# Patient Record
Sex: MALE | Race: WHITE | Employment: OTHER | URBAN - METROPOLITAN AREA
[De-identification: names, ages, dates, MRNs, and addresses within clinical notes are randomized per-mention and may not be internally consistent; named-entity substitution may affect disease eponyms.]

---

## 2017-01-27 ENCOUNTER — OFFICE VISIT (OUTPATIENT)
Dept: ORTHOPEDIC SURGERY | Age: 72
End: 2017-01-27

## 2017-01-27 VITALS
WEIGHT: 230 LBS | HEIGHT: 72 IN | SYSTOLIC BLOOD PRESSURE: 153 MMHG | HEART RATE: 64 BPM | BODY MASS INDEX: 31.15 KG/M2 | DIASTOLIC BLOOD PRESSURE: 69 MMHG

## 2017-01-27 DIAGNOSIS — M54.5 LOW BACK PAIN, UNSPECIFIED BACK PAIN LATERALITY, UNSPECIFIED CHRONICITY, WITH SCIATICA PRESENCE UNSPECIFIED: Primary | ICD-10-CM

## 2017-01-27 DIAGNOSIS — M51.36 DDD (DEGENERATIVE DISC DISEASE), LUMBAR: ICD-10-CM

## 2017-01-27 DIAGNOSIS — M47.817 LUMBOSACRAL SPONDYLOSIS WITHOUT MYELOPATHY: ICD-10-CM

## 2017-01-27 RX ORDER — ASPIRIN 81 MG/1
TABLET ORAL DAILY
COMMUNITY
End: 2019-04-29

## 2017-01-27 NOTE — MR AVS SNAPSHOT
Visit Information Date & Time Provider Department Dept. Phone Encounter #  
 1/27/2017  9:25 AM Vitaliy Mack MD [facility] [place] [phone] [id number] Follow-up Instructions Return in about 4 weeks (around 2/24/2017). Upcoming Health Maintenance Date Due Hepatitis C Screening 1945 DTaP/Tdap/Td series (1 - Tdap) 8/27/1967 FOBT Q 1 YEAR AGE 50-75 8/27/1996 ZOSTER VACCINE AGE 60> 8/27/2006 GLAUCOMA SCREENING Q2Y 8/27/2011 Pneumococcal 65+ Low/Medium Risk (1 of 2 - PCV13) 8/27/2011 MEDICARE YEARLY EXAM 8/27/2011 INFLUENZA AGE 9 TO ADULT 8/1/2016 Allergies as of 1/27/2017  Review Complete On: 1/27/2017 By: Vitaliy Mack MD  
  
 Severity Noted Reaction Type Reactions Niaspan [Niacin]  02/12/2014    Other (comments) Intolerance Current Immunizations  Never Reviewed No immunizations on file. Not reviewed this visit You Were Diagnosed With   
  
 Codes Comments Low back pain, unspecified back pain laterality, unspecified chronicity, with sciatica presence unspecified    -  Primary ICD-10-CM: M54.5 ICD-9-CM: 724.2 Lumbosacral spondylosis without myelopathy     ICD-10-CM: C93.934 ICD-9-CM: 721.3 DDD (degenerative disc disease), lumbar     ICD-10-CM: M51.36 
ICD-9-CM: 722.52 Vitals BP Pulse Height(growth percentile) Weight(growth percentile) BMI Smoking Status 153/69 64 6' (1.829 m) 230 lb (104.3 kg) 31.19 kg/m2 Never Smoker Vitals History BMI and BSA Data Body Mass Index Body Surface Area  
 31.19 kg/m 2 2.3 m 2 Preferred Pharmacy Pharmacy Name Phone Matt Snow, [place] [facility] [phone] Your Updated Medication List  
  
   
This list is accurate as of: 1/27/17 10:32 AM.  Always use your most recent med list.  
  
  
  
  
 aspirin delayed-release 81 mg tablet Take  by mouth daily. FISH OIL 1,000 mg Cap Generic drug:  omega-3 fatty acids-vitamin e Take 1 Cap by mouth. FLOMAX 0.4 mg capsule Generic drug:  tamsulosin Take 0.4 mg by mouth daily. We Performed the Following AMB POC XRAY, SPINE, LUMBOSACRAL; 2 O [54211 CPT(R)] REFERRAL TO PHYSICAL THERAPY [CKD85 Custom] Comments:  
 DX:LBP to LLE 
HEP 
LOCATION:In [facility] [address] 
2-3 visits/ 2-3 weeks Follow-up Instructions Return in about 4 weeks (around 2/24/2017). Referral Information Referral ID Referred By Referred To  
  
 [id number] JOSE ANDRE III Not Available Visits Status Start Date End Date 9 New Request 1/27/17 1/27/18 If your referral has a status of pending review or denied, additional information will be sent to support the outcome of this decision. Introducing [facility]! Balaji Huitron introduces [employer] patient portal. Now you can access parts of your medical record, email your doctor's office, and request medication refills online. 1. In your internet browser, go to [email or web address] 2. Click on the First Time User? Click Here link in the Sign In box. You will see the New Member Sign Up page. 3. Enter your [employer] Access Code exactly as it appears below. You will not need to use this code after youve completed the sign-up process. If you do not sign up before the expiration date, you must request a new code. · [employer] Access Code: [email or web address] Expires: 4/27/2017 10:31 AM 
 
4. Enter the last four digits of your Social Security Number (xxxx) and Date of Birth (mm/dd/yyyy) as indicated and click Submit. You will be taken to the next sign-up page. 5. Create a [employer] ID. This will be your [employer] login ID and cannot be changed, so think of one that is secure and easy to remember. 6. Create a [employer] password. You can change your password at any time. 7. Enter your Password Reset Question and Answer. This can be used at a later time if you forget your password. 8. Enter your e-mail address. You will receive e-mail notification when new information is available in [address] 9. Click Sign Up. You can now view and download portions of your medical record. 10. Click the Download Summary menu link to download a portable copy of your medical information. If you have questions, please visit the Frequently Asked Questions section of the [employer] website. Remember, [employer] is NOT to be used for urgent needs. For medical emergencies, dial 911. Now available from your iPhone and Android! Please provide this summary of care documentation to your next provider. Your primary care clinician is listed as Munira Sullivan. If you have any questions after today's visit, please call [phone].

## 2017-01-27 NOTE — PROGRESS NOTES
[facility]  [address], [place] [address]  Phone: [phone]  Fax: [phone]        INITIAL CONSULTATION      HISTORY OF PRESENT ILLNESS:  Catrachita Taylor. is a RH dominant 80 y.o. male whom is self referred secondary to left lower back pain. He reports pain in the lower back, radiating down the left leg to the ankle x 2 weeks. The distrubution of his pain is unclear at this time. Patient denies fever, weight loss, change in bowel habits or skin changes. Pt denies any inciting injuries. He reports that he experiences increase pain with standing and walking. He denies any prior surgery, blocks, or recent PT. He last tried PT in 1998. Pt repots intermittent lower back pain over the years but states that his left leg pain has never been this severe. He states that his leg pain has improved within the last week but he continues to experience left lower back pain at this time. Today, Pt rates his pain 2-4/10. ER Note from [facility] dated 11/08/2016 indicated the patient was seen c/o acute right lower back pain after lifting a heavy bag of soil. Pt's pain was easily reproducible with palpation and certain movements, suspected musculoskeletal related. Pt reports that during his ER visit he experienced right lowe back pain but at this time he reports left lower back pain. While at the ER, Pt was prescribed Flexeril 10 mg and Norco 5-325 mg but reports minimal benefit when taking the medication. He has been taking ibuprofen as needed. Of note, Pt is currently taking aspirin. Pt has hx of 4 renal stones and is a non-smoker.  reviewed. Past Medical History   Diagnosis Date    Abnormal PSA      5.6 =12/2013    BPH (benign prostatic hyperplasia)      flomax per PCP started 2011    Hypertension     Kidney stone      litho need 2012   : Th  Past Surgical History   Procedure Laterality Date    Hx colonoscopy  2007   e patient is no employed. Marital status:  The patient is . Current Outpatient Prescriptions   Medication Sig Dispense Refill    aspirin delayed-release 81 mg tablet Take  by mouth daily.  tamsulosin (FLOMAX) 0.4 mg capsule Take 0.4 mg by mouth daily.  omega-3 fatty acids-vitamin e (FISH OIL) 1,000 mg cap Take 1 Cap by mouth. Allergies   Allergen Reactions    Niaspan [Niacin] Other (comments)     Intolerance            Family History   Problem Relation Age of Onset    Hypertension Other        REVIEW OF SYSTEMS  Constitutional symptoms: Negative  Eyes: Negative  Ears, Nose, Throat, and Mouth: Negative  Cardiovascular: Negative  Respiratory: Negative  Genitourinary: Negative  Integumentary (Skin and/or breast): Negative  Musculoskeletal: Positive for lumbar pain  Extremities: Negative for edema. Endocrine/Rheumatologic: Negative  Hematologic/Lymphatic: Negative  Allergic/Immunologic: Negative  Psychiatric: Negative     PHYSICAL EXAMINATION    Visit Vitals    /69    Pulse 64    Ht 6' (1.829 m)    Wt 230 lb (104.3 kg)    BMI 31.19 kg/m2       CONSTITUTIONAL: NAD, A&O x 3  HEART: Regular rate and rhythm  ABDOMEN: Positive bowel sounds, soft, nontender, and nondistended  LUNGS: Clear to auscultation bilaterally. RANGE OF MOTION: The patient has full passive range of motion in all four extremities. SENSATION: Sensation is intact to light touch throughout. MOTOR:   Straight Leg Raise: Negative, bilateral  Steele: Negative, bilateral  Tandem Gait: Neg. Deep tendon reflexes are 1+ at the brachioradialis, biceps, and triceps. Deep tendon reflexes are 2+ at the knees and 1+ ankles bilaterally. Nontender to palpation in the lumbar region.       Shoulder AB/Flex Elbow Flex Wrist Ext Elbow Ext Wrist Flex Hand Intrin Tone   Right +4/5 +4/5 +4/5 +4/5 +4/5 +4/5 +4/5   Left +4/5 +4/5 +4/5 +4/5 +4/5 +4/5 +4/5              Hip Flex Knee Ext Knee Flex Ankle DF GTE Ankle PF Tone   Right +4/5 +4/5 +4/5 +4/5 +4/5 +4/5 +4/5   Left +4/5 +4/5 +4/5 +4/5 +4/5 +4/5 +4/5     RADIOGRAPHS  Preliminary reading of lumbar plain films:  Mild dextrorotary scoliosis if the lumbar spine. Superior endplate compress deformity at T12, age indeterminate. Small anterior osteophytes at all levels of the lumbar spine. These are being sent out for official reading by Dr. Hailey Holland. Ashley Dailey was seen today for back pain. Diagnoses and all orders for this visit:    Low back pain, unspecified back pain laterality, unspecified chronicity, with sciatica presence unspecified  -     [25789] L/S 2-3 views  -     REFERRAL TO PHYSICAL THERAPY    Lumbosacral spondylosis without myelopathy  -     REFERRAL TO PHYSICAL THERAPY    DDD (degenerative disc disease), lumbar  -     REFERRAL TO PHYSICAL THERAPY       IMPRESSIONS/RECOMMENDATIONS:  Pt c/o symptoms consistent with a lumbar radiculopathy. I discussed with patient multiple treatment options including medications, physical therapy, lumbar blocks, and surgery. I recommend he increase the frequency of HEP to daily. I will refer him to physical therapy with an emphasis on HEP. Pt does not wish to try medication at this time. I instructed to Pt against taking ibuprofen since he is taking aspirin. I will see the patient back 1 in month's time or earlier as needed. Written by Bryan Alves, as dictated by Maribell Peraza MD  I examined the patient, reviewed and agree with the note.

## 2017-02-08 ENCOUNTER — APPOINTMENT (OUTPATIENT)
Dept: PHYSICAL THERAPY | Age: 72
End: 2017-02-08

## 2017-02-15 ENCOUNTER — OFFICE VISIT (OUTPATIENT)
Dept: ORTHOPEDIC SURGERY | Age: 72
End: 2017-02-15

## 2017-02-15 NOTE — PROGRESS NOTES
[facility]  [address]  [address], [address] [place]   Phone: [phone]  Fax: [phone]        PROGRESS NOTE      HISTORY OF PRESENT ILLNESS:  The patient is a 80 y.o. male and was seen today for follow up of left lower back pain. He reports pain in the lower back, radiating down the left leg to the ankle since mid-January 2017. The distribution of his pain is unclear at this time. Pt denies any inciting injuries. He reports that he experiences increase pain with standing and walking. He denies any prior surgery, blocks, or recent PT. He last tried PT in 1998. Pt repots intermittent lower back pain over the years but states that his left leg pain has never been this severe. He states that his leg pain has improved within the last week but he continues to experience left lower back pain at this time. ER Note from [facility] dated 11/08/2016 indicated the patient was seen c/o acute right lower back pain after lifting a heavy bag of soil. Pt's pain was easily reproducible with palpation and certain movements, suspected musculoskeletal related. Pt reports that during his ER visit he experienced right lowe back pain but at this time he reports left lower back pain. While at the ER, Pt was prescribed Flexeril 10 mg and Norco 5-325 mg but reports minimal benefit when taking the medication. He has been taking ibuprofen as needed. Of note, Pt is currently taking aspirin. Pt has hx of 4 renal stones and is a non-smoker. Preliminary reading of lumbar plain films revealed mild dextrorotary scoliosis if the lumbar spine. Superior endplate compress deformity at T12, age indeterminate. Small anterior osteophytes at all levels of the lumbar spine. At his last clinical appointment, Pt c/o symptoms consistent with a lumbar radiculopathy. I discussed with patient multiple treatment options including medications, physical therapy, lumbar blocks, and surgery.  I recommended he increase the frequency of HEP to daily. I referred him to physical therapy with an emphasis on HEP. Pt did not wish to try medication at that time. I instructed to Pt against taking ibuprofen since he was taking aspirin. Today, Pt rates his pain 2-4/10. The patient returns today ***. She rates pain ***.  reviewed. Past Medical History   Diagnosis Date    Abnormal PSA      5.6 =12/2013    BPH (benign prostatic hyperplasia)      flomax per PCP started 2011    Hypertension     Kidney stone      litho need 2012        Social History     Social History    Marital status:      Spouse name: N/A    Number of children: N/A    Years of education: N/A     Occupational History    Not on file. Social History Main Topics    Smoking status: Never Smoker    Smokeless tobacco: Never Used    Alcohol use No    Drug use: No    Sexual activity: Not on file     Other Topics Concern    Not on file     Social History Narrative       Current Outpatient Prescriptions   Medication Sig Dispense Refill    aspirin delayed-release 81 mg tablet Take  by mouth daily.  tamsulosin (FLOMAX) 0.4 mg capsule Take 0.4 mg by mouth daily.  omega-3 fatty acids-vitamin e (FISH OIL) 1,000 mg cap Take 1 Cap by mouth. Allergies   Allergen Reactions    Niaspan [Niacin] Other (comments)     Intolerance          PHYSICAL EXAMINATION    There were no vitals taken for this visit. CONSTITUTIONAL: NAD, A&O x 3  SENSATION: Intact to light touch throughout  NEURO: Davi's is negative bilaterally. RANGE OF MOTION: The patient has full passive range of motion in all four extremities.   MOTOR:  Straight Leg Raise: {Pos/neg/not test:904061::\"Negative\"}, {right/left:34724}    *** Shoulder AB/Flex Elbow Flex Wrist Ext Elbow Ext Wrist Flex Hand Intrin Tone   Right +4/5 +4/5 +4/5 +4/5 +4/5 +4/5 +4/5   Left +4/5 +4/5 +4/5 +4/5 +4/5 +4/5 +4/5             *** Hip Flex Knee Ext Knee Flex Ankle DF GTE Ankle PF Tone   Right +4/5 +4/5 +4/5 +4/5 +4/5 +4/5 +4/5   Left +4/5 +4/5 +4/5 +4/5 +4/5 +4/5 +4/5       ASSESSMENT   There are no diagnoses linked to this encounter. IMPRESSION AND PLAN:  *** I will see the patient back in *** month's time or earlier if needed.       Written by Semaj Jonas, as dictated by Magdalena Watson MD

## 2017-02-22 ENCOUNTER — APPOINTMENT (OUTPATIENT)
Dept: PHYSICAL THERAPY | Age: 72
End: 2017-02-22

## 2017-03-01 ENCOUNTER — OFFICE VISIT (OUTPATIENT)
Dept: ORTHOPEDIC SURGERY | Age: 72
End: 2017-03-01

## 2017-03-01 ENCOUNTER — DOCUMENTATION ONLY (OUTPATIENT)
Dept: ORTHOPEDIC SURGERY | Age: 72
End: 2017-03-01

## 2017-03-01 VITALS
SYSTOLIC BLOOD PRESSURE: 138 MMHG | DIASTOLIC BLOOD PRESSURE: 76 MMHG | HEIGHT: 72 IN | HEART RATE: 58 BPM | WEIGHT: 232.4 LBS | BODY MASS INDEX: 31.48 KG/M2 | RESPIRATION RATE: 16 BRPM

## 2017-03-01 DIAGNOSIS — M51.36 OTHER INTERVERTEBRAL DISC DEGENERATION, LUMBAR REGION: ICD-10-CM

## 2017-03-01 DIAGNOSIS — S32.020A COMPRESSION FRACTURE OF L2, CLOSED, INITIAL ENCOUNTER (HCC): Primary | ICD-10-CM

## 2017-03-01 DIAGNOSIS — M47.817 SPONDYLOSIS WITHOUT MYELOPATHY OR RADICULOPATHY, LUMBOSACRAL REGION: ICD-10-CM

## 2017-03-01 RX ORDER — HYDROCODONE BITARTRATE AND ACETAMINOPHEN 5; 325 MG/1; MG/1
TABLET ORAL
Refills: 0 | COMMUNITY
Start: 2017-02-10 | End: 2017-08-16 | Stop reason: SDUPTHER

## 2017-03-01 RX ORDER — DIAZEPAM 2 MG/1
TABLET ORAL
Qty: 1 TAB | Refills: 0 | Status: SHIPPED | OUTPATIENT
Start: 2017-03-01 | End: 2017-03-15 | Stop reason: ALTCHOICE

## 2017-03-01 NOTE — MR AVS SNAPSHOT
Visit Information Date & Time Provider Department Dept. Phone Encounter #  
 3/1/2017  8:40 AM Wilson Riuz  [facility] [address] [facility] [place] [phone] [id number] Follow-up Instructions Return for following MRI. Your Appointments 3/13/2017  8:45 AM  
BIOPSY with Madelyn Kelly MD  
[facility] ([facility]) Appt Note: bx  
 [address] 
[address] [facility] [address]  
[address]  
  
    
  
 3/30/2017  9:00 AM  
Any with Madelyn Kelly MD  
[facility] ([facility]) Appt Note: 2wk bx fu  
 [address] 
[address] [facility] [address]  
[address] [facility] [address] [facility] [address] [facility] [address] Upcoming Health Maintenance Date Due Hepatitis C Screening 1945 DTaP/Tdap/Td series (1 - Tdap) 8/27/1967 FOBT Q 1 YEAR AGE 50-75 8/27/1996 ZOSTER VACCINE AGE 60> 8/27/2006 GLAUCOMA SCREENING Q2Y 8/27/2011 MEDICARE YEARLY EXAM 8/27/2011 Pneumococcal 65+ Low/Medium Risk (2 of 2 - PPSV23) 10/3/2017 Allergies as of 3/1/2017  Review Complete On: 3/1/2017 By: Wilson Ruiz MD  
  
 Severity Noted Reaction Type Reactions Niaspan [Niacin]  02/12/2014    Other (comments) Intolerance Current Immunizations  Never Reviewed No immunizations on file. Not reviewed this visit You Were Diagnosed With   
  
 Codes Comments Compression fracture of L2, closed, initial encounter ([address]    -  Primary ICD-10-CM: C99.632H ICD-9-CM: 805.4 Spondylosis without myelopathy or radiculopathy, lumbosacral region     ICD-10-CM: M47.817 ICD-9-CM: 721.3 Other intervertebral disc degeneration, lumbar region     ICD-10-CM: M51.36 
ICD-9-CM: 722.52 Vitals BP  
  
  
  
  
  
 138/76 (BP 1 Location: Left arm, BP Patient Position: Sitting) Vitals History BMI and BSA Data Body Mass Index Body Surface Area  
 31.52 kg/m 2 2.31 m 2 Preferred Pharmacy Pharmacy Name Phone [place] [phone] Your Updated Medication List  
  
   
This list is accurate as of: 3/1/17  9:51 AM.  Always use your most recent med list.  
  
  
  
  
 aspirin delayed-release 81 mg tablet Take  by mouth daily. diclofenac potassium 50 mg tablet Commonly known as:  CATAFLAM  
Take 50 mg by mouth three (3) times daily. FISH OIL 1,000 mg Cap Generic drug:  omega-3 fatty acids-vitamin e Take 1 Cap by mouth. FLOMAX 0.4 mg capsule Generic drug:  tamsulosin Take 0.4 mg by mouth daily. HYDROcodone-acetaminophen 5-325 mg per tablet Commonly known as:  Chay Herrlich  
take 1 tablet by mouth every 4 hours if needed for pain CAUSES DROWSINESS  
  
 levoFLOXacin 750 mg tablet Commonly known as:  Lamount Chalet Take 1 Tab by mouth daily. Follow-up Instructions Return for following MRI. To-Do List   
 03/08/2017 Imaging:  MRI LUMB SPINE WO CONT Introducing [address]! Rodolfo Queen introduces [employer] patient portal. Now you can access parts of your medical record, email your doctor's office, and request medication refills online. 1. In your internet browser, go to [email or web address] 2. Click on the First Time User? Click Here link in the Sign In box. You will see the New Member Sign Up page. 3. Enter your [employer] Access Code exactly as it appears below. You will not need to use this code after youve completed the sign-up process. If you do not sign up before the expiration date, you must request a new code. · [employer] Access Code: [email or web address] Expires: 4/27/2017 10:31 AM 
 
4. Enter the last four digits of your Social Security Number (xxxx) and Date of Birth (mm/dd/yyyy) as indicated and click Submit.  You will be taken to the next sign-up page. 5. Create a [employer] ID. This will be your [employer] login ID and cannot be changed, so think of one that is secure and easy to remember. 6. Create a [employer] password. You can change your password at any time. 7. Enter your Password Reset Question and Answer. This can be used at a later time if you forget your password. 8. Enter your e-mail address. You will receive e-mail notification when new information is available in [address] 9. Click Sign Up. You can now view and download portions of your medical record. 10. Click the Download Summary menu link to download a portable copy of your medical information. If you have questions, please visit the Frequently Asked Questions section of the [employer] website. Remember, [employer] is NOT to be used for urgent needs. For medical emergencies, dial 911. Now available from your iPhone and Android! Please provide this summary of care documentation to your next provider. Your primary care clinician is listed as Julio Mckeon. If you have any questions after today's visit, please call [phone].

## 2017-03-01 NOTE — PROGRESS NOTES
[facility]  [address], [address]  Phone: [phone]  Fax: [phone]        PROGRESS NOTE      HISTORY OF PRESENT ILLNESS:  The patient is a 80 y.o. male and was seen today for follow up of left lower back pain. He reports pain in the lower back, radiating down the left leg to the ankle since early January 2017. The distribution of his pain is unclear at this time. Pt denies any inciting injuries. He reports that he experiences increase pain with standing and walking. He denies any prior surgery, blocks, or recent PT. He last tried PT in 1998. Pt repots intermittent lower back pain over the years but states that his left leg pain has never been this severe. He states that his leg pain has improved within the last week but he continues to experience left lower back pain at this time. ER Note from [facility] dated 11/08/2016 indicated the patient was seen c/o acute right lower back pain after lifting a heavy bag of soil. Pt's pain was easily reproducible with palpation and certain movements, suspected musculoskeletal related. Pt reports that during his ER visit he experienced right lower back pain but at this time he reports left lower back pain. While at the ER, Pt was prescribed Flexeril 10 mg and Norco 5-325 mg but reports minimal benefit when taking the medication. He has been taking ibuprofen as needed. Of note, Pt is currently taking aspirin. Pt has hx of 4 renal stones and is a non-smoker. Preliminary reading of lumbar plain films revealed mild dextrorotary scoliosis if the lumbar spine. Superior endplate compress deformity at T12, age indeterminate. Small anterior osteophytes at all levels of the lumbar spine. at his last clinical appointment, Pt c/o symptoms consistent with a lumbar radiculopathy. I discussed with patient multiple treatment options including medications, physical therapy, lumbar blocks, and surgery.  I recommended he increase the frequency of HEP to daily. I referred him to physical therapy with an emphasis on HEP. Pt did not wish to try medication at that time. I instructed to Pt against taking ibuprofen since he was taking aspirin. The patient returns today with right-sided abdominal extending into the groin. He rates pain 5-6/10, an increase since his last visit (2-4/10). He is accompanied by his wife today. Pt denies ever starting physical therapy due to abdominal pain. He subsequently reported to his PCP who ordered a CT of his abdomen/pelvis along with a bone scan. Note from Dr. Alexa Cuadra dated 2/7/17 indicating patient had c/o right abdominal pain and swelling of lower abdomen. Of note, patient gains some relief with Ibuprofen and rated pain 4-5/10 at that time. Pt states he has a follow up appointment scheduled for 3/7/17 with his PCP. He also has a biopsy of his prostate scheduled for 3/13/17. CT abdomen/pelvic dated 2/9/17 per report revealed bilateral renal peripelvic cysts. 3 mm upper pole right renal calculus. No hydronephrosis. Very enlarged prostate. Small fat-containing umbilical hernia. Small hiatal hernia. Small left inguinal hernia. Minimal dependent atelectasis or scarring in lung bases. L2 superior endplate compression deformity with L2 sclerosis. Cannot absolutely differential the possibility of metastatic disease versus osteoporotic related fracture. Consider bone scan. L1 vertebral body hemangioma. NM whole body bone scan dated 2/17/17 per report revealed abnormal intense tracer uptake in the region of the the L2 vertebral body, consistent with hisotyr of reported L2 fracture. Additional sites of abnormal tracer uptake in the right [id number] vertebral body region and right hemipelvis. Diagnostic considerations are worrisome for metastatic disease. Recommend correlation with cross-sectional imaging in patient history.  reviewed which revealed patient is receiving [place] from Dr. Art Mansfield.        Past Medical History:   Diagnosis Date    Abnormal PSA     5.6 =12/2013    BPH (benign prostatic hyperplasia)     flomax per PCP started 2011    Hypertension     Kidney stone     litho need 2012        Social History     Social History    Marital status:      Spouse name: N/A    Number of children: N/A    Years of education: N/A     Occupational History    Not on file. Social History Main Topics    Smoking status: Never Smoker    Smokeless tobacco: Never Used    Alcohol use No    Drug use: No    Sexual activity: Not on file     Other Topics Concern    Not on file     Social History Narrative       Current Outpatient Prescriptions   Medication Sig Dispense Refill    HYDROcodone-acetaminophen (NORCO) 5-325 mg per tablet take 1 tablet by mouth every 4 hours if needed for pain CAUSES DROWSINESS  0    diazePAM (VALIUM) 2 mg tablet 1 tab PO 30 minutes prior to procedure 1 Tab 0    aspirin delayed-release 81 mg tablet Take  by mouth daily.  tamsulosin (FLOMAX) 0.4 mg capsule Take 0.4 mg by mouth daily.  omega-3 fatty acids-vitamin e (FISH OIL) 1,000 mg cap Take 1 Cap by mouth.  diclofenac potassium (CATAFLAM) 50 mg tablet Take 50 mg by mouth three (3) times daily.  levoFLOXacin (LEVAQUIN) 750 mg tablet Take 1 Tab by mouth daily. 3 Tab 0       Allergies   Allergen Reactions    Niaspan [Niacin] Other (comments)     Intolerance          PHYSICAL EXAMINATION    Visit Vitals    /76 (BP 1 Location: Left arm, BP Patient Position: Sitting)    Pulse (!) 58    Resp 16    Ht 6' (1.829 m)    Wt 232 lb 6.4 oz (105.4 kg)    BMI 31.52 kg/m2       CONSTITUTIONAL: NAD, A&O x 3  SENSATION: No increased groin pain with internal rotation of the right hip. Intact to light touch throughout  RANGE OF MOTION: Decreased ROM, right hip. The patient has full passive range of motion in all four extremities.   MOTOR:  Straight Leg Raise: Negative, bilateral               Hip Flex Knee Ext Knee Flex Ankle DF GTE Ankle PF Tone   Right +4/5 +4/5 +4/5 +4/5 +4/5 +4/5 +4/5   Left +4/5 +4/5 +4/5 +4/5 +4/5 +4/5 +4/5       ASSESSMENT   Alfa Yeager was seen today for back pain and leg pain. Diagnoses and all orders for this visit:    Compression fracture of L2, closed, initial encounter ([facility] [address]  -     Cancel: MRI LUMB SPINE WO CONT; Future  -     MRI LUMB SPINE WO CONT; Future    Spondylosis without myelopathy or radiculopathy, lumbosacral region  -     Cancel: MRI LUMB SPINE WO CONT; Future  -     MRI LUMB SPINE WO CONT; Future    Other intervertebral disc degeneration, lumbar region  -     Cancel: MRI LUMB SPINE WO CONT; Future  -     MRI LUMB SPINE WO CONT; Future    Other orders  -     diazePAM (VALIUM) 2 mg tablet; 1 tab PO 30 minutes prior to procedure          IMPRESSION AND PLAN:  The patient did not start physical therapy due to onset of abdominal pain. His abdomen/pelvic CT revealed L2 superior endplate compression deformity which patient states has been present for roughly two months now. I explained to patient the natural course of healing of this type of injury. Will consult with Dr. Abby Garcia in regards to what he plans to do as far as additional workup for questionable metastatic disease noted on CT which was ordered by himself. I will set him up for a lumbar spine MRI. I advised patient to bring copies of films to next visit. I will see the patient back following MRI. Written by George Adame, as dictated by Enoc Strickland MD  I examined the patient, reviewed and agree with the note.

## 2017-03-01 NOTE — PROGRESS NOTES
MRI Lumbar w/o is scheduled at [facility], [id number], on 03/12/17, arrive 7:15AM, test 7:45AM. No Medicare pre-authorization is required.

## 2017-03-13 PROBLEM — R97.20 ELEVATED PROSTATE SPECIFIC ANTIGEN (PSA): Status: ACTIVE | Noted: 2017-03-13

## 2017-03-15 ENCOUNTER — OFFICE VISIT (OUTPATIENT)
Dept: ORTHOPEDIC SURGERY | Age: 72
End: 2017-03-15

## 2017-03-15 VITALS
HEIGHT: 72 IN | SYSTOLIC BLOOD PRESSURE: 123 MMHG | WEIGHT: 229.2 LBS | RESPIRATION RATE: 16 BRPM | HEART RATE: 63 BPM | DIASTOLIC BLOOD PRESSURE: 78 MMHG | BODY MASS INDEX: 31.04 KG/M2

## 2017-03-15 DIAGNOSIS — M47.817 SPONDYLOSIS WITHOUT MYELOPATHY OR RADICULOPATHY, LUMBOSACRAL REGION: ICD-10-CM

## 2017-03-15 DIAGNOSIS — M51.36 OTHER INTERVERTEBRAL DISC DEGENERATION, LUMBAR REGION: ICD-10-CM

## 2017-03-15 DIAGNOSIS — M84.48XA PATHOLOGICAL FRACTURE OF LUMBAR VERTEBRA: Primary | ICD-10-CM

## 2017-03-15 DIAGNOSIS — S32.020A COMPRESSION FRACTURE OF L2, CLOSED, INITIAL ENCOUNTER (HCC): ICD-10-CM

## 2017-03-15 RX ORDER — ACETAMINOPHEN 500 MG
TABLET ORAL
COMMUNITY

## 2017-03-15 NOTE — MR AVS SNAPSHOT
Visit Information Date & Time Provider Department Dept. Phone Encounter #  
 3/15/2017  8:30 AM Willy Morin MD [facility] - SPECIALTY [facility] [phone] [id number] Follow-up Instructions Return if symptoms worsen or fail to improve. Your Appointments 3/31/2017  1:05 PM  
DIAG TEST F/U with Willy Morin MD  
[address] [facility], [address] [facility] - SPECIALTY [facility] ([address]) Appt Note: review lumbar mri obici 03/12/17 2012 [facility] [address]  
  
    
  
 3/30/2017  9:00 AM  
Any with Joel Mancuso MD  
[facility] ([address]) Appt Note: 2wk bx fu  
 [address] 
[address] [facility] [address]  
[address] [facility] [address] [facility] [address] Upcoming Health Maintenance Date Due Hepatitis C Screening 1945 DTaP/Tdap/Td series (1 - Tdap) 8/27/1967 FOBT Q 1 YEAR AGE 50-75 8/27/1996 ZOSTER VACCINE AGE 60> 8/27/2006 GLAUCOMA SCREENING Q2Y 8/27/2011 MEDICARE YEARLY EXAM 8/27/2011 Pneumococcal 65+ Low/Medium Risk (2 of 2 - PPSV23) 10/3/2017 Allergies as of 3/15/2017  Review Complete On: 3/15/2017 By: Willy Morin MD  
  
 Severity Noted Reaction Type Reactions Niaspan [Niacin]  02/12/2014    Other (comments) Intolerance Current Immunizations  Never Reviewed No immunizations on file. Not reviewed this visit You Were Diagnosed With   
  
 Codes Comments Pathological fracture of lumbar vertebra    -  Primary ICD-10-CM: B03.74JE ICD-9-CM: 733.13 Vitals BP Pulse Resp Height(growth percentile) Weight(growth percentile) BMI  
 123/78 (BP 1 Location: Left arm, BP Patient Position: Sitting) 63 16 6' (1.829 m) 229 lb 3.2 oz (104 kg) 31.09 kg/m2 Smoking Status Never Smoker BMI and BSA Data Body Mass Index Body Surface Area 31.09 kg/m 2 2.3 m 2 Preferred Pharmacy Pharmacy Name Phone Matt [facility], [facility] [phone] Your Updated Medication List  
  
   
This list is accurate as of: 3/15/17  9:22 AM.  Always use your most recent med list.  
  
  
  
  
 aspirin delayed-release 81 mg tablet Take  by mouth daily. diclofenac potassium 50 mg tablet Commonly known as:  CATAFLAM  
Take 50 mg by mouth three (3) times daily. FISH OIL 1,000 mg Cap Generic drug:  omega-3 fatty acids-vitamin e Take 1 Cap by mouth. FLOMAX 0.4 mg capsule Generic drug:  tamsulosin Take 0.4 mg by mouth daily. HYDROcodone-acetaminophen 5-325 mg per tablet Commonly known as:  Bobby Dolphin  
take 1 tablet by mouth every 4 hours if needed for pain CAUSES DROWSINESS  
  
 TYLENOL EXTRA STRENGTH 500 mg tablet Generic drug:  acetaminophen Take  by mouth every six (6) hours as needed for Pain. We Performed the Following REFERRAL TO ONCOLOGY [BMU29 Custom] Comments:  
 Kayce Lovett or Dr. Billy Johnson Follow-up Instructions Return if symptoms worsen or fail to improve. Referral Information Referral ID Referred By Referred To  
  
 [id number] JOSE ANDRE III Not Available Visits Status Start Date End Date 1 New Request 3/15/17 3/15/18 If your referral has a status of pending review or denied, additional information will be sent to support the outcome of this decision. Introducing [address]! Kayce Meza introduces [employer] patient portal. Now you can access parts of your medical record, email your doctor's office, and request medication refills online. 1. In your internet browser, go to [email or web address] 2. Click on the First Time User? Click Here link in the Sign In box. You will see the New Member Sign Up page. 3. Enter your [email or web address] Access Code exactly as it appears below. You will not need to use this code after youve completed the sign-up process. If you do not sign up before the expiration date, you must request a new code. · [email or web address] Access Code: [email or web address] Expires: 4/27/2017 11:31 AM 
 
4. Enter the last four digits of your Social Security Number (xxxx) and Date of Birth (mm/dd/yyyy) as indicated and click Submit. You will be taken to the next sign-up page. 5. Create a [email or web address] ID. This will be your [email or web address] login ID and cannot be changed, so think of one that is secure and easy to remember. 6. Create a [email or web address] password. You can change your password at any time. 7. Enter your Password Reset Question and Answer. This can be used at a later time if you forget your password. 8. Enter your e-mail address. You will receive e-mail notification when new information is available in [address] 9. Click Sign Up. You can now view and download portions of your medical record. 10. Click the Download Summary menu link to download a portable copy of your medical information. If you have questions, please visit the Frequently Asked Questions section of the [email or web address] website. Remember, [email or web address] is NOT to be used for urgent needs. For medical emergencies, dial 911. Now available from your iPhone and Android! Please provide this summary of care documentation to your next provider. Your primary care clinician is listed as Enrique Veras. If you have any questions after today's visit, please call [phone].

## 2017-03-15 NOTE — PROGRESS NOTES
[facility]  [address], [address]  Phone: [phone]  Fax: [phone]        PROGRESS NOTE      HISTORY OF PRESENT ILLNESS:  The patient is a 80 y.o. male and was seen today for follow up of left low back pain. He reports pain in the lower back, radiating down the LLE to the ankle since early January 2017. The distribution of his pain is unclear at this time. Pt denies any inciting injuries. He reports that he experiences increase pain with standing and walking. He denies any prior surgery, blocks, or recent PT. He last tried PT in 1998. Pt reports intermittent lower back pain over the years but states that his left leg pain has never been this severe. He states that his leg pain has improved within the last week but he continues to experience left lower back pain at this time. ER Note from [facility] dated 11/08/2016 indicated the patient was seen c/o acute right lower back pain after lifting a heavy bag of soil. Pt's pain was easily reproducible with palpation and certain movements, suspected musculoskeletal related. Pt reports that during his ER visit he experienced right lower back pain but at this time he reports left lower back pain. While at the ER, Pt was prescribed Flexeril 10 mg and Norco 5-325 mg but reports minimal benefit when taking the medication. Pt continues [place] and is receiving refills from Dr. Karine Paredes. He has been taking ibuprofen as needed. Of note, Pt is currently taking aspirin. Pt has hx of 4 renal stones and is a non-smoker. Preliminary reading of lumbar plain films revealed mild dextrorotary scoliosis if the lumbar spine. Superior endplate compress deformity at T12, age indeterminate. Small anterior osteophytes at all levels of the lumbar spine. disease versus osteoporotic related fracture. Consider bone scan. L1 vertebral body hemangioma. More recently, he was seen with c/o right-sided abdominal extending into the groin.  Pt denies ever starting physical therapy due to abdominal pain. Note from Dr. Mikhail Broussard dated 2/7/17 indicating patient had c/o right abdominal pain and swelling of lower abdomen. Of note, patient gains some relief with Ibuprofen and rated pain 4-5/10 at that time. Pt states he has a follow up appointment scheduled for 3/7/17 with his PCP. CT abdomen/pelvic dated 2/9/17 per report revealed bilateral renal peripelvic cysts. 3 mm upper pole right renal calculus. No hydronephrosis. Very enlarged prostate. Small fat-containing umbilical hernia. Small hiatal hernia. Small left inguinal hernia. Minimal dependent atelectasis or scarring in lung bases. L2 superior endplate compression deformity with L2 sclerosis. Cannot absolutely differential the possibility of metastatic disease versus osteoporotic related fracture. Consider bone scan. L1 vertebral body hemangioma. NM whole body bone scan dated 2/17/17 per report revealed abnormal intense tracer uptake in the region of the the L2 vertebral body, consistent with history of reported L2 fracture. Additional sites of abnormal tracer uptake in the right [id number] vertebral body region and right hemipelvis. Diagnostic considerations are worrisome for metastatic disease. Recommend correlation with cross-sectional imaging in patient history. At his last clinical appointment, the patient did not start physical therapy due to onset of abdominal pain. His abdomen/pelvic CT revealed L2 superior endplate compression deformity which patient stated had been present for roughly > two months now. I explained to patient the natural course of healing of this type of injury. Consulted with Dr. Karine Paredes in regards to what he planned to do as far as additional workup for questionable metastatic disease noted on CT which was ordered by himself. I set him up for a lumbar spine MRI. The patient returns today with right flank pain, not specifically lumbar spinal pain.  He rates pain 6-9/10, elevated since his last visit (5-6/10). Pt is accompanied by his wife today. Biopsy of the prostate has been completed with results pending. Lumbar spine MRI report and films dated 3/13/17 reviewed. Per report, the fracture at L2 has features of a pathologic fracture, with the vertebral body being essentially completely replaced, but no extraosseous tumor. No retropulsion. There is only minimal los of vertebral body height. This almost certainly represents metastatic disease. Marrow-replacing lesion in the right side of T11 with extraosseous extension into the neural foramen and high likelihood of a right T11 nerve root impingement. This almost certainly represents metastatic disease. Minor multilevel disc disease without neural impingement.  reviewed. Past Medical History:   Diagnosis Date    Abnormal PSA     5.6 =12/2013    BPH (benign prostatic hyperplasia)     flomax per PCP started 2011    Hypertension     Kidney stone     litho need 2012        Social History     Social History    Marital status:      Spouse name: N/A    Number of children: N/A    Years of education: N/A     Occupational History    Not on file. Social History Main Topics    Smoking status: Never Smoker    Smokeless tobacco: Never Used    Alcohol use No    Drug use: No    Sexual activity: Not on file     Other Topics Concern    Not on file     Social History Narrative       Current Outpatient Prescriptions   Medication Sig Dispense Refill    acetaminophen (TYLENOL EXTRA STRENGTH) 500 mg tablet Take  by mouth every six (6) hours as needed for Pain.  HYDROcodone-acetaminophen (NORCO) 5-325 mg per tablet take 1 tablet by mouth every 4 hours if needed for pain CAUSES DROWSINESS  0    tamsulosin (FLOMAX) 0.4 mg capsule Take 0.4 mg by mouth daily.  omega-3 fatty acids-vitamin e (FISH OIL) 1,000 mg cap Take 1 Cap by mouth.  diclofenac potassium (CATAFLAM) 50 mg tablet Take 50 mg by mouth three (3) times daily.  aspirin delayed-release 81 mg tablet Take  by mouth daily. Allergies   Allergen Reactions    Niaspan [Niacin] Other (comments)     Intolerance          PHYSICAL EXAMINATION    Visit Vitals    /78 (BP 1 Location: Left arm, BP Patient Position: Sitting)    Pulse 63    Resp 16    Ht 6' (1.829 m)    Wt 229 lb 3.2 oz (104 kg)    BMI 31.09 kg/m2       CONSTITUTIONAL: NAD, A&O x 3  SENSATION: Intact to light touch throughout  RANGE OF MOTION: The patient has full passive range of motion in all four extremities. MOTOR:  Straight Leg Raise: Negative, bilateral               Hip Flex Knee Ext Knee Flex Ankle DF GTE Ankle PF Tone   Right +4/5 +4/5 +4/5 +4/5 +4/5 +4/5 +4/5   Left +4/5 +4/5 +4/5 +4/5 +4/5 +4/5 +4/5       ASSESSMENT   Jodi Shafer was seen today for back pain. Diagnoses and all orders for this visit:    Pathological fracture of lumbar vertebra  -     REFERRAL TO ONCOLOGY          IMPRESSION AND PLAN:  His L2 fracture could be due to metastatic disease. We discussed the option of kyphoplasty versus vertebroplasty. I do not believe his right flank and abdominal pain is related to lumbar spinal pathology. His pain appears likely due to metastatic disease. Workup for prostate is in progress. I will be referring patient to oncology for further evaluation/treatment. I will see the patient back on an as-needed basis    Written by Robert Arreaga, as dictated by Maribell Peraza MD  I examined the patient, reviewed and agree with the note.

## 2017-08-16 PROBLEM — C79.51 OSSEOUS METASTASIS (HCC): Status: ACTIVE | Noted: 2017-08-16

## 2017-08-16 PROBLEM — C61 MALIGNANT NEOPLASM OF PROSTATE (HCC): Status: ACTIVE | Noted: 2017-08-16

## 2021-01-20 PROBLEM — R94.39 ABNORMAL STRESS TEST: Status: ACTIVE | Noted: 2020-05-20

## 2021-01-20 PROBLEM — E78.49 OTHER HYPERLIPIDEMIA: Status: ACTIVE | Noted: 2020-05-20

## 2022-04-17 ENCOUNTER — HOME HEALTH ADMISSION (OUTPATIENT)
Dept: HOME HEALTH SERVICES | Facility: HOME HEALTH | Age: 77
End: 2022-04-17